# Patient Record
Sex: MALE | Race: WHITE | NOT HISPANIC OR LATINO | Employment: FULL TIME | RURAL
[De-identification: names, ages, dates, MRNs, and addresses within clinical notes are randomized per-mention and may not be internally consistent; named-entity substitution may affect disease eponyms.]

---

## 2020-12-14 ENCOUNTER — HISTORICAL (OUTPATIENT)
Dept: ADMINISTRATIVE | Facility: HOSPITAL | Age: 50
End: 2020-12-14

## 2020-12-14 LAB
BASOPHILS # BLD AUTO: 0.02 X10E3/UL (ref 0–0.2)
BASOPHILS NFR BLD AUTO: 0.3 % (ref 0–1)
EOSINOPHIL # BLD AUTO: 0.16 X10E3/UL (ref 0–0.5)
EOSINOPHIL NFR BLD AUTO: 2.7 % (ref 1–4)
ERYTHROCYTE [DISTWIDTH] IN BLOOD BY AUTOMATED COUNT: 13.5 % (ref 11.5–14.5)
HCT VFR BLD AUTO: 47.4 % (ref 40–54)
HGB BLD-MCNC: 16.4 G/DL (ref 13.5–18)
IMM GRANULOCYTES # BLD AUTO: 0.01 X10E3/UL (ref 0–0.04)
IMM GRANULOCYTES NFR BLD: 0.2 % (ref 0–0.4)
LYMPHOCYTES # BLD AUTO: 1.75 X10E3/UL (ref 1–4.8)
LYMPHOCYTES NFR BLD AUTO: 29.9 % (ref 27–41)
MCH RBC QN AUTO: 29.8 PG (ref 27–31)
MCHC RBC AUTO-ENTMCNC: 34.6 G/DL (ref 32–36)
MCV RBC AUTO: 86 FL (ref 80–96)
MONOCYTES # BLD AUTO: 0.51 X10E3/UL (ref 0–0.8)
MONOCYTES NFR BLD AUTO: 8.7 % (ref 2–6)
MPC BLD CALC-MCNC: 11.1 FL (ref 9.4–12.4)
NEUTROPHILS # BLD AUTO: 3.4 X10E3/UL (ref 1.8–7.7)
NEUTROPHILS NFR BLD AUTO: 58.2 % (ref 53–65)
NRBC # BLD AUTO: 0 X10E3/UL (ref 0–0)
NRBC, AUTO (.00): 0 /100 (ref 0–0)
PLATELET # BLD AUTO: 158 X10E3/UL (ref 150–400)
RBC # BLD AUTO: 5.51 X10E6/UL (ref 4.6–6.2)
WBC # BLD AUTO: 5.85 X10E3/UL (ref 4.5–11)

## 2020-12-16 LAB
MAYO GENERIC ORDERABLE RESULT: NORMAL
MAYO INTERP AND REPORT: NORMAL

## 2021-12-06 ENCOUNTER — LAB VISIT (OUTPATIENT)
Dept: PRIMARY CARE CLINIC | Facility: CLINIC | Age: 51
End: 2021-12-06

## 2021-12-06 DIAGNOSIS — Z02.83 ENCOUNTER FOR EMPLOYMENT-RELATED DRUG TESTING: ICD-10-CM

## 2021-12-06 PROCEDURE — 99000 PR SPECIMEN HANDLING,DR OFF->LAB: ICD-10-PCS | Mod: ,,, | Performed by: NURSE PRACTITIONER

## 2021-12-06 PROCEDURE — 99000 SPECIMEN HANDLING OFFICE-LAB: CPT | Mod: ,,, | Performed by: NURSE PRACTITIONER

## 2022-01-11 ENCOUNTER — CLINICAL SUPPORT (OUTPATIENT)
Dept: PRIMARY CARE CLINIC | Facility: CLINIC | Age: 52
End: 2022-01-11

## 2022-01-11 DIAGNOSIS — Z02.89 ENCOUNTER FOR PHYSICAL EXAMINATION RELATED TO EMPLOYMENT: Primary | ICD-10-CM

## 2022-01-11 LAB
ALBUMIN SERPL BCP-MCNC: 4.2 G/DL (ref 3.5–5)
ALBUMIN/GLOB SERPL: 1.2 {RATIO}
ALP SERPL-CCNC: 64 U/L (ref 45–115)
ALT SERPL W P-5'-P-CCNC: 47 U/L (ref 16–61)
ANION GAP SERPL CALCULATED.3IONS-SCNC: 13 MMOL/L (ref 7–16)
AST SERPL W P-5'-P-CCNC: 24 U/L (ref 15–37)
BASOPHILS # BLD AUTO: 0.01 K/UL (ref 0–0.2)
BASOPHILS NFR BLD AUTO: 0.2 % (ref 0–1)
BILIRUB SERPL-MCNC: 0.8 MG/DL (ref 0–1.2)
BUN SERPL-MCNC: 13 MG/DL (ref 7–18)
BUN/CREAT SERPL: 14 (ref 6–20)
CALCIUM SERPL-MCNC: 8.8 MG/DL (ref 8.5–10.1)
CHLORIDE SERPL-SCNC: 101 MMOL/L (ref 98–107)
CO2 SERPL-SCNC: 28 MMOL/L (ref 21–32)
CREAT SERPL-MCNC: 0.93 MG/DL (ref 0.7–1.3)
DIFFERENTIAL METHOD BLD: ABNORMAL
EOSINOPHIL # BLD AUTO: 0.27 K/UL (ref 0–0.5)
EOSINOPHIL NFR BLD AUTO: 4.9 % (ref 1–4)
ERYTHROCYTE [DISTWIDTH] IN BLOOD BY AUTOMATED COUNT: 13.7 % (ref 11.5–14.5)
GLOBULIN SER-MCNC: 3.6 G/DL (ref 2–4)
GLUCOSE SERPL-MCNC: 131 MG/DL (ref 74–106)
HCT VFR BLD AUTO: 49.4 % (ref 40–54)
HGB BLD-MCNC: 16.3 G/DL (ref 13.5–18)
IMM GRANULOCYTES # BLD AUTO: 0.03 K/UL (ref 0–0.04)
IMM GRANULOCYTES NFR BLD: 0.5 % (ref 0–0.4)
LYMPHOCYTES # BLD AUTO: 1.72 K/UL (ref 1–4.8)
LYMPHOCYTES NFR BLD AUTO: 31.4 % (ref 27–41)
MCH RBC QN AUTO: 28.5 PG (ref 27–31)
MCHC RBC AUTO-ENTMCNC: 33 G/DL (ref 32–36)
MCV RBC AUTO: 86.5 FL (ref 80–96)
MONOCYTES # BLD AUTO: 0.64 K/UL (ref 0–0.8)
MONOCYTES NFR BLD AUTO: 11.7 % (ref 2–6)
MPC BLD CALC-MCNC: 10.4 FL (ref 9.4–12.4)
NEUTROPHILS # BLD AUTO: 2.8 K/UL (ref 1.8–7.7)
NEUTROPHILS NFR BLD AUTO: 51.3 % (ref 53–65)
NRBC # BLD AUTO: 0 X10E3/UL
NRBC, AUTO (.00): 0 %
PLATELET # BLD AUTO: 181 K/UL (ref 150–400)
POTASSIUM SERPL-SCNC: 4.5 MMOL/L (ref 3.5–5.1)
PROT SERPL-MCNC: 7.8 G/DL (ref 6.4–8.2)
RBC # BLD AUTO: 5.71 M/UL (ref 4.6–6.2)
SODIUM SERPL-SCNC: 137 MMOL/L (ref 136–145)
WBC # BLD AUTO: 5.47 K/UL (ref 4.5–11)

## 2022-01-11 PROCEDURE — 92552 PURE TONE AUDIOMETRY AIR: CPT | Mod: ,,, | Performed by: NURSE PRACTITIONER

## 2022-01-11 PROCEDURE — 99080 OSHA QUESTIONNAIRE: ICD-10-PCS | Mod: ,,, | Performed by: NURSE PRACTITIONER

## 2022-01-11 PROCEDURE — 99499 UNLISTED E&M SERVICE: CPT | Mod: ,,, | Performed by: NURSE PRACTITIONER

## 2022-01-11 PROCEDURE — 94010 BREATHING CAPACITY TEST: ICD-10-PCS | Mod: ,,, | Performed by: NURSE PRACTITIONER

## 2022-01-11 PROCEDURE — 99499 PR PHYSICAL - BASIC NON DOT/CDL: ICD-10-PCS | Mod: ,,, | Performed by: NURSE PRACTITIONER

## 2022-01-11 PROCEDURE — 92552 PR PURE TONE AUDIOMETRY, AIR: ICD-10-PCS | Mod: ,,, | Performed by: NURSE PRACTITIONER

## 2022-01-11 PROCEDURE — 94010 BREATHING CAPACITY TEST: CPT | Mod: ,,, | Performed by: NURSE PRACTITIONER

## 2022-01-11 PROCEDURE — 99172 OCULAR FUNCTION SCREEN: CPT | Mod: ,,, | Performed by: NURSE PRACTITIONER

## 2022-01-11 PROCEDURE — 99080 SPECIAL REPORTS OR FORMS: CPT | Mod: ,,, | Performed by: NURSE PRACTITIONER

## 2022-01-11 PROCEDURE — 36415 PR COLLECTION VENOUS BLOOD,VENIPUNCTURE: ICD-10-PCS | Mod: ,,, | Performed by: CLINICAL MEDICAL LABORATORY

## 2022-01-11 PROCEDURE — 99172 PR VISUAL FUNCT SCREENING, BILAT: ICD-10-PCS | Mod: ,,, | Performed by: NURSE PRACTITIONER

## 2022-01-11 PROCEDURE — 36415 COLL VENOUS BLD VENIPUNCTURE: CPT | Mod: ,,, | Performed by: CLINICAL MEDICAL LABORATORY

## 2022-01-11 NOTE — PROGRESS NOTES
cbcSubjective:       Patient ID: Velasquez Farley is a 51 y.o. male.    Chief Complaint: No chief complaint on file.    HPI  Review of Systems      Objective:      Physical Exam    Assessment:       Problem List Items Addressed This Visit    None     Visit Diagnoses     Encounter for physical examination related to employment    -  Primary    Relevant Orders    CBC Auto Differential    Comprehensive Metabolic Panel    Lead, Blood    Heavy Metals Screen, Blood (Quantitative)          Plan:       See scanned documents in .

## 2022-01-12 LAB
ADDRESS: NORMAL
ADDRESS: NORMAL
ARSENIC BLD-MCNC: <1 NG/ML
ATTENDING PHYSICIAN NAME: NORMAL
ATTENDING PHYSICIAN NAME: NORMAL
CADMIUM BLD-MCNC: 0.3 NG/ML
COUNTY OF RESIDENCE: NORMAL
COUNTY OF RESIDENCE: NORMAL
EMPLOYER NAME: NORMAL
EMPLOYER NAME: NORMAL
FACILITY PHONE #: NORMAL
FACILITY PHONE #: NORMAL
HX OF OCCUPATION: NORMAL
HX OF OCCUPATION: NORMAL
LEAD BLDV-MCNC: 1.1 MCG/DL
LEAD BLDV-MCNC: 1.1 MCG/DL
M HEALTH CARE PROVIDER PHONE: NORMAL
M HEALTH CARE PROVIDER PHONE: NORMAL
M PATIENT CITY: NORMAL
M PATIENT CITY: NORMAL
MERCURY BLD-MCNC: <1 NG/ML
PHONE #: NORMAL
PHONE #: NORMAL
POSTAL CODE: NORMAL
POSTAL CODE: NORMAL
PROVIDER CITY: NORMAL
PROVIDER CITY: NORMAL
PROVIDER POSTAL CODE: NORMAL
PROVIDER POSTAL CODE: NORMAL
PROVIDER STATE: NORMAL
PROVIDER STATE: NORMAL
REFER PHYSICIAN ADDR: NORMAL
REFER PHYSICIAN ADDR: NORMAL
SPECIMEN SOURCE: NORMAL
STATE OF RESIDENCE: NORMAL
STATE OF RESIDENCE: NORMAL

## 2022-09-09 ENCOUNTER — LAB VISIT (OUTPATIENT)
Dept: PRIMARY CARE CLINIC | Facility: CLINIC | Age: 52
End: 2022-09-09

## 2022-09-09 DIAGNOSIS — Z02.83 ENCOUNTER FOR EMPLOYMENT-RELATED DRUG TESTING: Primary | ICD-10-CM

## 2022-09-09 PROCEDURE — 99000 SPECIMEN HANDLING OFFICE-LAB: CPT | Mod: ,,, | Performed by: NURSE PRACTITIONER

## 2022-09-09 PROCEDURE — 99000 PR SPECIMEN HANDLING,DR OFF->LAB: ICD-10-PCS | Mod: ,,, | Performed by: NURSE PRACTITIONER

## 2022-09-09 NOTE — PROGRESS NOTES
Subjective:       Patient ID: Velasquez Farley is a 51 y.o. male.    Chief Complaint: No chief complaint on file.    HPI  Review of Systems      Objective:      Physical Exam    Assessment:       Problem List Items Addressed This Visit    None  Visit Diagnoses       Encounter for employment-related drug testing    -  Primary            Plan:       Drug testing only

## 2023-01-23 ENCOUNTER — LAB VISIT (OUTPATIENT)
Dept: PRIMARY CARE CLINIC | Facility: CLINIC | Age: 53
End: 2023-01-23

## 2023-01-23 DIAGNOSIS — Z02.83 ENCOUNTER FOR DRUG SCREENING: Primary | ICD-10-CM

## 2023-01-23 PROCEDURE — 99000 SPECIMEN HANDLING OFFICE-LAB: CPT | Mod: ,,, | Performed by: NURSE PRACTITIONER

## 2023-01-23 PROCEDURE — 99000 PR SPECIMEN HANDLING,DR OFF->LAB: ICD-10-PCS | Mod: ,,, | Performed by: NURSE PRACTITIONER

## 2023-01-23 NOTE — PROGRESS NOTES
Subjective:       Patient ID: Velasquez Farley is a 52 y.o. male.    Chief Complaint: No chief complaint on file.    HPI  Review of Systems      Objective:      Physical Exam    Assessment:       Problem List Items Addressed This Visit    None  Visit Diagnoses       Encounter for drug screening    -  Primary            Plan:       Drug testing only

## 2023-10-26 ENCOUNTER — CLINICAL SUPPORT (OUTPATIENT)
Dept: PRIMARY CARE CLINIC | Facility: CLINIC | Age: 53
End: 2023-10-26

## 2023-10-26 DIAGNOSIS — Z02.89 ENCOUNTER FOR PHYSICAL EXAMINATION RELATED TO EMPLOYMENT: Primary | ICD-10-CM

## 2023-10-26 DIAGNOSIS — Z01.10 ENCOUNTER FOR HEARING EXAMINATION, UNSPECIFIED WHETHER ABNORMAL FINDINGS: ICD-10-CM

## 2023-10-26 DIAGNOSIS — Z01.00 ENCOUNTER FOR VISION SCREENING: ICD-10-CM

## 2023-10-26 DIAGNOSIS — Z00.8 ENCOUNTER FOR PULMONARY FUNCTION TESTING: ICD-10-CM

## 2023-10-26 LAB
BASOPHILS # BLD AUTO: 0.01 K/UL (ref 0–0.2)
BASOPHILS NFR BLD AUTO: 0.2 % (ref 0–1)
DIFFERENTIAL METHOD BLD: ABNORMAL
EOSINOPHIL # BLD AUTO: 0.09 K/UL (ref 0–0.5)
EOSINOPHIL NFR BLD AUTO: 1.5 % (ref 1–4)
ERYTHROCYTE [DISTWIDTH] IN BLOOD BY AUTOMATED COUNT: 13.7 % (ref 11.5–14.5)
HCT VFR BLD AUTO: 47.6 % (ref 40–54)
HGB BLD-MCNC: 16 G/DL (ref 13.5–18)
IMM GRANULOCYTES # BLD AUTO: 0.03 K/UL (ref 0–0.04)
IMM GRANULOCYTES NFR BLD: 0.5 % (ref 0–0.4)
LYMPHOCYTES # BLD AUTO: 1.46 K/UL (ref 1–4.8)
LYMPHOCYTES NFR BLD AUTO: 25 % (ref 27–41)
MCH RBC QN AUTO: 28.8 PG (ref 27–31)
MCHC RBC AUTO-ENTMCNC: 33.6 G/DL (ref 32–36)
MCV RBC AUTO: 85.8 FL (ref 80–96)
MONOCYTES # BLD AUTO: 0.46 K/UL (ref 0–0.8)
MONOCYTES NFR BLD AUTO: 7.9 % (ref 2–6)
MPC BLD CALC-MCNC: 11.6 FL (ref 9.4–12.4)
NEUTROPHILS # BLD AUTO: 3.8 K/UL (ref 1.8–7.7)
NEUTROPHILS NFR BLD AUTO: 64.9 % (ref 53–65)
NRBC # BLD AUTO: 0 X10E3/UL
NRBC, AUTO (.00): 0 %
PLATELET # BLD AUTO: 133 K/UL (ref 150–400)
RBC # BLD AUTO: 5.55 M/UL (ref 4.6–6.2)
WBC # BLD AUTO: 5.85 K/UL (ref 4.5–11)

## 2023-10-26 PROCEDURE — 36415 COLL VENOUS BLD VENIPUNCTURE: CPT | Mod: ,,, | Performed by: NURSE PRACTITIONER

## 2023-10-26 PROCEDURE — 92552 PURE TONE AUDIOMETRY AIR: CPT | Mod: ,,, | Performed by: NURSE PRACTITIONER

## 2023-10-26 PROCEDURE — 36415 PR COLLECTION VENOUS BLOOD,VENIPUNCTURE: ICD-10-PCS | Mod: ,,, | Performed by: NURSE PRACTITIONER

## 2023-10-26 PROCEDURE — 99172 PR VISUAL FUNCT SCREENING, BILAT: ICD-10-PCS | Mod: ,,, | Performed by: NURSE PRACTITIONER

## 2023-10-26 PROCEDURE — 85025 COMPLETE CBC W/AUTO DIFF WBC: CPT | Performed by: NURSE PRACTITIONER

## 2023-10-26 PROCEDURE — 94010 BREATHING CAPACITY TEST: ICD-10-PCS | Mod: ,,, | Performed by: NURSE PRACTITIONER

## 2023-10-26 PROCEDURE — 99499 PR PHYSICAL - BASIC NON DOT/CDL: ICD-10-PCS | Mod: ,,, | Performed by: NURSE PRACTITIONER

## 2023-10-26 PROCEDURE — 99080 OSHA QUESTIONNAIRE: ICD-10-PCS | Mod: ,,, | Performed by: NURSE PRACTITIONER

## 2023-10-26 PROCEDURE — 94010 BREATHING CAPACITY TEST: CPT | Mod: ,,, | Performed by: NURSE PRACTITIONER

## 2023-10-26 PROCEDURE — 83655 ASSAY OF LEAD: CPT | Mod: 90 | Performed by: NURSE PRACTITIONER

## 2023-10-26 PROCEDURE — 99499 UNLISTED E&M SERVICE: CPT | Mod: ,,, | Performed by: NURSE PRACTITIONER

## 2023-10-26 PROCEDURE — 99080 SPECIAL REPORTS OR FORMS: CPT | Mod: ,,, | Performed by: NURSE PRACTITIONER

## 2023-10-26 PROCEDURE — 82300 ASSAY OF CADMIUM: CPT | Mod: 90 | Performed by: NURSE PRACTITIONER

## 2023-10-26 PROCEDURE — 92552 PR PURE TONE AUDIOMETRY, AIR: ICD-10-PCS | Mod: ,,, | Performed by: NURSE PRACTITIONER

## 2023-10-26 PROCEDURE — 99172 OCULAR FUNCTION SCREEN: CPT | Mod: ,,, | Performed by: NURSE PRACTITIONER

## 2023-10-26 NOTE — PROGRESS NOTES
Subjective     Patient ID: Velasquez Farley is a 53 y.o. male.    Chief Complaint: No chief complaint on file.    HPI  Review of Systems       Objective     Physical Exam       Assessment and Plan     1. Encounter for physical examination related to employment  -     Heavy Metals Screen, Blood (Quantitative)  -     Lead, Blood  -     CBC Auto Differential    2. Encounter for hearing examination, unspecified whether abnormal findings    3. Encounter for vision screening    4. Encounter for pulmonary function testing        See scanned documents in .            No follow-ups on file.

## 2023-10-27 LAB
ADDRESS: NORMAL
ADDRESS: NORMAL
ARSENIC BLD-MCNC: <1 NG/ML
ATTENDING PHYSICIAN NAME: NORMAL
ATTENDING PHYSICIAN NAME: NORMAL
CADMIUM BLD-MCNC: 0.3 NG/ML
COUNTY OF RESIDENCE: NORMAL
COUNTY OF RESIDENCE: NORMAL
EMPLOYER NAME: NORMAL
EMPLOYER NAME: NORMAL
FACILITY PHONE #: NORMAL
FACILITY PHONE #: NORMAL
HX OF OCCUPATION: NORMAL
HX OF OCCUPATION: NORMAL
LEAD BLDV-MCNC: 1.2 MCG/DL
LEAD BLDV-MCNC: 1.3 MCG/DL
M HEALTH CARE PROVIDER PHONE: NORMAL
M HEALTH CARE PROVIDER PHONE: NORMAL
M PATIENT CITY: NORMAL
M PATIENT CITY: NORMAL
MERCURY BLD-MCNC: 5 NG/ML
PHONE #: NORMAL
PHONE #: NORMAL
POSTAL CODE: NORMAL
POSTAL CODE: NORMAL
PROVIDER CITY: NORMAL
PROVIDER CITY: NORMAL
PROVIDER POSTAL CODE: NORMAL
PROVIDER POSTAL CODE: NORMAL
PROVIDER STATE: NORMAL
PROVIDER STATE: NORMAL
REFER PHYSICIAN ADDR: NORMAL
REFER PHYSICIAN ADDR: NORMAL
SPECIMEN SOURCE: NORMAL
STATE OF RESIDENCE: NORMAL
STATE OF RESIDENCE: NORMAL

## 2024-09-05 ENCOUNTER — LAB VISIT (OUTPATIENT)
Dept: PRIMARY CARE CLINIC | Facility: CLINIC | Age: 54
End: 2024-09-05

## 2024-09-05 DIAGNOSIS — Z02.83 ENCOUNTER FOR DRUG SCREENING: Primary | ICD-10-CM

## 2024-09-05 NOTE — PROGRESS NOTES
Subjective     Patient ID: Velasquez Farley is a 53 y.o. male.    Chief Complaint: No chief complaint on file.    HPI  Review of Systems       Objective     Physical Exam       Assessment and Plan     1. Encounter for drug screening        Drug testing only           No follow-ups on file.

## 2024-10-28 ENCOUNTER — CLINICAL SUPPORT (OUTPATIENT)
Dept: PRIMARY CARE CLINIC | Facility: CLINIC | Age: 54
End: 2024-10-28

## 2024-10-28 DIAGNOSIS — Z01.10 ENCOUNTER FOR HEARING EXAMINATION, UNSPECIFIED WHETHER ABNORMAL FINDINGS: ICD-10-CM

## 2024-10-28 DIAGNOSIS — Z01.00 ENCOUNTER FOR VISION SCREENING: ICD-10-CM

## 2024-10-28 DIAGNOSIS — Z02.89 ENCOUNTER FOR PHYSICAL EXAMINATION RELATED TO EMPLOYMENT: Primary | ICD-10-CM

## 2024-10-28 DIAGNOSIS — Z00.8 ENCOUNTER FOR PULMONARY FUNCTION TESTING: ICD-10-CM

## 2024-10-28 LAB
BASOPHILS # BLD AUTO: 0.02 K/UL (ref 0–0.2)
BASOPHILS NFR BLD AUTO: 0.3 % (ref 0–1)
DIFFERENTIAL METHOD BLD: ABNORMAL
EOSINOPHIL # BLD AUTO: 0.18 K/UL (ref 0–0.5)
EOSINOPHIL NFR BLD AUTO: 2.5 % (ref 1–4)
ERYTHROCYTE [DISTWIDTH] IN BLOOD BY AUTOMATED COUNT: 14.1 % (ref 11.5–14.5)
HCT VFR BLD AUTO: 49.8 % (ref 40–54)
HGB BLD-MCNC: 16.1 G/DL (ref 13.5–18)
IMM GRANULOCYTES # BLD AUTO: 0.09 K/UL (ref 0–0.04)
IMM GRANULOCYTES NFR BLD: 1.2 % (ref 0–0.4)
LYMPHOCYTES # BLD AUTO: 2.39 K/UL (ref 1–4.8)
LYMPHOCYTES NFR BLD AUTO: 32.6 % (ref 27–41)
MCH RBC QN AUTO: 28.8 PG (ref 27–31)
MCHC RBC AUTO-ENTMCNC: 32.3 G/DL (ref 32–36)
MCV RBC AUTO: 88.9 FL (ref 80–96)
MONOCYTES # BLD AUTO: 0.56 K/UL (ref 0–0.8)
MONOCYTES NFR BLD AUTO: 7.6 % (ref 2–6)
MPC BLD CALC-MCNC: 11.7 FL (ref 9.4–12.4)
NEUTROPHILS # BLD AUTO: 4.09 K/UL (ref 1.8–7.7)
NEUTROPHILS NFR BLD AUTO: 55.8 % (ref 53–65)
NRBC # BLD AUTO: 0 X10E3/UL
NRBC, AUTO (.00): 0 %
PLATELET # BLD AUTO: 152 K/UL (ref 150–400)
RBC # BLD AUTO: 5.6 M/UL (ref 4.6–6.2)
WBC # BLD AUTO: 7.33 K/UL (ref 4.5–11)

## 2024-10-28 PROCEDURE — 99499 UNLISTED E&M SERVICE: CPT | Mod: ,,, | Performed by: NURSE PRACTITIONER

## 2024-10-28 PROCEDURE — 85025 COMPLETE CBC W/AUTO DIFF WBC: CPT | Performed by: NURSE PRACTITIONER

## 2024-10-28 PROCEDURE — 94010 BREATHING CAPACITY TEST: CPT | Mod: ,,, | Performed by: NURSE PRACTITIONER

## 2024-10-28 PROCEDURE — 36415 COLL VENOUS BLD VENIPUNCTURE: CPT | Mod: ,,, | Performed by: NURSE PRACTITIONER

## 2024-10-28 PROCEDURE — 92552 PURE TONE AUDIOMETRY AIR: CPT | Mod: ,,, | Performed by: NURSE PRACTITIONER

## 2024-10-28 PROCEDURE — 99080 SPECIAL REPORTS OR FORMS: CPT | Mod: ,,, | Performed by: NURSE PRACTITIONER

## 2024-10-28 PROCEDURE — 99172 OCULAR FUNCTION SCREEN: CPT | Mod: ,,, | Performed by: NURSE PRACTITIONER
